# Patient Record
Sex: FEMALE | URBAN - METROPOLITAN AREA
[De-identification: names, ages, dates, MRNs, and addresses within clinical notes are randomized per-mention and may not be internally consistent; named-entity substitution may affect disease eponyms.]

---

## 2022-06-08 ENCOUNTER — TELEPHONE (OUTPATIENT)
Dept: DERMATOLOGY | Facility: CLINIC | Age: 62
End: 2022-06-08
Payer: COMMERCIAL

## 2022-06-08 NOTE — TELEPHONE ENCOUNTER
----- Message from Genaro Lovell sent at 6/8/2022  4:59 PM CDT -----  Contact: Patient  Patient requesting call back in regards to scheduling an appointment. Patient stated before scheduling a NP appointment she would lie to speak with nurse.       Patient @[phone] (home)

## 2022-06-08 NOTE — TELEPHONE ENCOUNTER
----- Message from Genaro Lovell sent at 6/8/2022  4:56 PM CDT -----  Contact: Patient  Patient requesting call back in regards to scheduling an appointment. Patient stated before scheduling a NP appointment she would lie to speak with nurse.       Patient @[phone] (home)